# Patient Record
(demographics unavailable — no encounter records)

---

## 2024-11-11 NOTE — HISTORY OF PRESENT ILLNESS
[FreeTextEntry1] : ARSH is an 8-year-old male who is followed due to Potter sequence with normal kidney and blader sonogram.  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  He is being seen as a follow up visit. ARSH has had no cardiac complaints.  Specifically, there has been no chest pain, palpitations, excessive diaphoresis, shortness of breath, lightheadedness, or syncope. There has been no recent change in activity level, no fatigue, and no difficulty gaining weight or weight loss. ARSH is active in soccer and has had no recent decrease in exercise athletic endurance.

## 2024-11-11 NOTE — DISCUSSION/SUMMARY
[PE + No Restrictions] : [unfilled] may participate in the entire physical education program without restriction, including all varsity competitive sports. [FreeTextEntry1] : In summary, ARSH is a 9-year male with a functional heart murmur.  I discussed at length with the family that the murmur is not related to cardiac pathology and may get louder during times of illness or fever.  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 It is of no hemodynamic consequence but needs to be monitored.   He is overweight. His body mass index is at the 99th percentile for age. Healthy dietary suggestions were made. He should drink at least 6-8 cups of water per day. He should increase his intake of fruits, vegetables, low fat dairy and lean protein sources. Simple carbohydrates, soft drinks, juices and less nutritious snacks should be limited. He should have at least 30-60 minutes of active play and exercise every day.   No restrictions are needed from a cardiac perspective.   The family verbalized understanding, and all questions were answered.   Further cardiology follow-up is in 1 year, earlier as clinically indicated. [Needs SBE Prophylaxis] : [unfilled] does not need bacterial endocarditis prophylaxis

## 2024-11-11 NOTE — PHYSICAL EXAM
[General Appearance - Alert] : alert [General Appearance - In No Acute Distress] : in no acute distress [General Appearance - Well Nourished] : well nourished [General Appearance - Well Developed] : well developed [General Appearance - Well-Appearing] : well appearing [Facies] : the head and face were normal in appearance [Appearance Of Head] : the head was normocephalic [Other ___] : [unfilled] [Sclera] : the conjunctiva were normal [Outer Ear] : the ears and nose were normal in appearance [Examination Of The Oral Cavity] : mucous membranes were moist and pink [Auscultation Breath Sounds / Voice Sounds] : breath sounds clear to auscultation bilaterally [Normal Chest Appearance] : the chest was normal in appearance [Apical Impulse] : quiet precordium with normal apical impulse [Heart Rate And Rhythm] : normal heart rate and rhythm [Heart Sounds] : normal S1 and S2 [Heart Sounds Gallop] : no gallops [Heart Sounds Pericardial Friction Rub] : no pericardial rub [Heart Sounds Click] : no clicks [Arterial Pulses] : normal upper and lower extremity pulses with no pulse delay [Edema] : no edema [Capillary Refill Test] : normal capillary refill [Systolic] : systolic [II] : a grade 2/6 [LMSB] : LMSB  [Low] : low pitched [Vibratory] : vibratory [Mid] : mid [Bowel Sounds] : normal bowel sounds [Abdomen Soft] : soft [Nondistended] : nondistended [Abdomen Tenderness] : non-tender [Nail Clubbing] : no clubbing  or cyanosis of the fingers [Motor Tone] : normal muscle strength and tone [Cervical Lymph Nodes Enlarged Anterior] : The anterior cervical nodes were normal [Cervical Lymph Nodes Enlarged Posterior] : The posterior cervical nodes were normal [] : no rash [Skin Lesions] : no lesions [Skin Turgor] : normal turgor [Demonstrated Behavior - Infant Nonreactive To Parents] : interactive [Mood] : mood and affect were appropriate for age [Demonstrated Behavior] : normal behavior

## 2024-11-11 NOTE — CONSULT LETTER
[Today's Date] : [unfilled] [Name] : Name: [unfilled] [] : : ~~ [Today's Date:] : [unfilled] [Dear  ___:] : Dear Dr. [unfilled]: [Consult] : I had the pleasure of evaluating your patient, [unfilled]. My full evaluation follows. [Consult - Single Provider] : Thank you very much for allowing me to participate in the care of this patient. If you have any questions, please do not hesitate to contact me. [Sincerely,] : Sincerely, [FreeTextEntry4] : Janis Herron MD [FreeTextEntry5] : [id number] [FreeTextEntry6] : [place] [address] [de-identified] : Andi Mcdaniel MD, FACC, FASE, FAAP\par  Pediatric Cardiologist\par  [facility]\par

## 2024-11-11 NOTE — CARDIOLOGY SUMMARY
[Today's Date] : [unfilled] [LVSF ___%] : LV Shortening Fraction [unfilled]% [FreeTextEntry1] : Normal sinus rhythm, normal QRS axis, normal intervals (QTc 437 msec), left ventricular hypertrophy, no pre-excitation, no ST segment or T wave abnormalities. Abnormal EKG. 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 LV dimensions and shortening fraction were normal.  No pericardial effusion.